# Patient Record
Sex: MALE | Race: WHITE
[De-identification: names, ages, dates, MRNs, and addresses within clinical notes are randomized per-mention and may not be internally consistent; named-entity substitution may affect disease eponyms.]

---

## 2020-12-18 ENCOUNTER — HOSPITAL ENCOUNTER (OUTPATIENT)
Dept: HOSPITAL 63 - LAB | Age: 55
End: 2020-12-18
Attending: NURSE ANESTHETIST, CERTIFIED REGISTERED
Payer: COMMERCIAL

## 2020-12-18 DIAGNOSIS — Z20.828: ICD-10-CM

## 2020-12-18 DIAGNOSIS — Z12.11: ICD-10-CM

## 2020-12-18 DIAGNOSIS — Z01.812: Primary | ICD-10-CM

## 2020-12-18 PROCEDURE — U0003 INFECTIOUS AGENT DETECTION BY NUCLEIC ACID (DNA OR RNA); SEVERE ACUTE RESPIRATORY SYNDROME CORONAVIRUS 2 (SARS-COV-2) (CORONAVIRUS DISEASE [COVID-19]), AMPLIFIED PROBE TECHNIQUE, MAKING USE OF HIGH THROUGHPUT TECHNOLOGIES AS DESCRIBED BY CMS-2020-01-R: HCPCS

## 2020-12-18 PROCEDURE — C9803 HOPD COVID-19 SPEC COLLECT: HCPCS

## 2020-12-22 ENCOUNTER — HOSPITAL ENCOUNTER (OUTPATIENT)
Dept: HOSPITAL 63 - SURG | Age: 55
Discharge: HOME | End: 2020-12-22
Attending: INTERNAL MEDICINE
Payer: COMMERCIAL

## 2020-12-22 VITALS
DIASTOLIC BLOOD PRESSURE: 65 MMHG | DIASTOLIC BLOOD PRESSURE: 65 MMHG | SYSTOLIC BLOOD PRESSURE: 110 MMHG | SYSTOLIC BLOOD PRESSURE: 110 MMHG

## 2020-12-22 DIAGNOSIS — K57.30: ICD-10-CM

## 2020-12-22 DIAGNOSIS — Z79.899: ICD-10-CM

## 2020-12-22 DIAGNOSIS — K64.8: ICD-10-CM

## 2020-12-22 DIAGNOSIS — D12.5: ICD-10-CM

## 2020-12-22 DIAGNOSIS — Z12.11: Primary | ICD-10-CM

## 2020-12-22 PROCEDURE — 88302 TISSUE EXAM BY PATHOLOGIST: CPT

## 2020-12-22 PROCEDURE — 82947 ASSAY GLUCOSE BLOOD QUANT: CPT

## 2020-12-22 PROCEDURE — 88305 TISSUE EXAM BY PATHOLOGIST: CPT

## 2020-12-22 PROCEDURE — 45385 COLONOSCOPY W/LESION REMOVAL: CPT

## 2020-12-24 NOTE — PATHOLOGY
[facility] Accession Number: [id number]

.                                                                01

Material submitted:                                        .

PART A: cecum - CECAL POLYP

PART B: colon - SIGMOID POLYP. Modifiers: sigmoid

.                                                                02

**********************************************************************

Diagnosis:

A.  "Cecal polyp", biopsy:

- Vegatative material only, no colonic mucosa/tissue identified

histologically.

.

B.  "Sigmoid polyp", biopsy:

- Tubular adenoma; no high-grade dysplasia.

(CLW:ed; 12/24/2020)

S  12/24/2020  1243 Local

**********************************************************************

.                                                                02

Comment:

Clinical and endoscopic correlation is required.

(CLW:ed; 12/24/2020)

.                                                                02

Electronically signed:                                     .

Lisa Zayas MD, Pathologist

NPI- [id number]

.                                                                01

Gross description:                                         .

A. The specimen is received in formalin, labeled "Jay, Rubén, cecal

polyp" and consists of abundant vegetative material admixed with possible

pink-tan tissue measuring 2.6 x 2.6 x 0.4 cm which is entirely submitted

in A1.

.

B. The specimen is received in formalin, labeled "Thompson, Rubén, sigmoid

polyp" and consists of a polypoid segment of brown tissue measuring 0.8 x

0.8 x 0.5 cm.  The margin is inked black.  It is trisected and entirely

submitted in B1.

(SDY; 12/23/2020)

SYU/SYU  12/23/2020  1307 Local

.                                                                02

Pathologist provided ICD-10:

D12.5, Z12.11

.                                                                02

CPT                                                        .

393926, [id number]

Specimen Comment: A courtesy copy of this report has been sent to [phone], [phone]

Specimen Comment: [id number]

Specimen Comment: Report sent to  / 

***Performed at:  01

   [facility]

   [address], [place], [place]  [id number]

   MD Kam Rivera MD Phone:  [phone]

***Performed at:  02

   [facility]

   [address] [place], [place]  [id number]

   MD Charles Long MD Phone:  [phone]